# Patient Record
Sex: MALE | Race: OTHER
[De-identification: names, ages, dates, MRNs, and addresses within clinical notes are randomized per-mention and may not be internally consistent; named-entity substitution may affect disease eponyms.]

---
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
## 2024-08-13 ENCOUNTER — APPOINTMENT (OUTPATIENT)
Dept: PULMONOLOGY | Facility: CLINIC | Age: 19
End: 2024-08-13
Payer: COMMERCIAL

## 2024-08-13 VITALS
BODY MASS INDEX: 28.09 KG/M2 | HEIGHT: 69.5 IN | SYSTOLIC BLOOD PRESSURE: 90 MMHG | TEMPERATURE: 98.3 F | DIASTOLIC BLOOD PRESSURE: 62 MMHG | HEART RATE: 87 BPM | WEIGHT: 194 LBS | OXYGEN SATURATION: 98 %

## 2024-08-13 DIAGNOSIS — R06.83 SNORING: ICD-10-CM

## 2024-08-13 DIAGNOSIS — G47.9 SLEEP DISORDER, UNSPECIFIED: ICD-10-CM

## 2024-08-13 PROCEDURE — 99203 OFFICE O/P NEW LOW 30 MIN: CPT

## 2024-08-15 NOTE — HISTORY OF PRESENT ILLNESS
[Never] : never [TextBox_4] : 19-year-old male presents for evaluation of possible sleep apnea.  Patient snores with occasional apneic episodes.  Complains of daytime somnolence and fatigue.  He denies significant alcohol intake or sedative hypnotic med use. [Awakes Unrefreshed] : awakes unrefreshed [Daytime Somnolence] : daytime somnolence [Fatigue] : fatigue [Snoring] : snoring [Witnessed Apneas] : witnessed apneas

## 2024-08-15 NOTE — DISCUSSION/SUMMARY
[FreeTextEntry1] : 19-year-old male with complaints consistent with sleep apnea.  The meaning of sleep apnea was discussed at length and the need for diagnosis was also discussed.  A home sleep test will be performed.  He is to avoid sedative hypnotic meds as well as excessive alcohol intake.  He has to follow-up with his PMD as before.

## 2024-10-29 ENCOUNTER — APPOINTMENT (OUTPATIENT)
Dept: PULMONOLOGY | Facility: CLINIC | Age: 19
End: 2024-10-29
Payer: COMMERCIAL

## 2024-10-29 VITALS
DIASTOLIC BLOOD PRESSURE: 66 MMHG | SYSTOLIC BLOOD PRESSURE: 108 MMHG | TEMPERATURE: 97.3 F | WEIGHT: 188 LBS | OXYGEN SATURATION: 99 % | HEART RATE: 78 BPM

## 2024-10-29 PROCEDURE — 99214 OFFICE O/P EST MOD 30 MIN: CPT
